# Patient Record
Sex: FEMALE | URBAN - METROPOLITAN AREA
[De-identification: names, ages, dates, MRNs, and addresses within clinical notes are randomized per-mention and may not be internally consistent; named-entity substitution may affect disease eponyms.]

---

## 2017-01-23 ENCOUNTER — OFFICE VISIT (OUTPATIENT)
Dept: FAMILY MEDICINE | Facility: CLINIC | Age: 22
End: 2017-01-23
Payer: COMMERCIAL

## 2017-01-23 VITALS
WEIGHT: 135.25 LBS | SYSTOLIC BLOOD PRESSURE: 102 MMHG | TEMPERATURE: 100 F | HEIGHT: 60 IN | DIASTOLIC BLOOD PRESSURE: 50 MMHG | BODY MASS INDEX: 26.55 KG/M2

## 2017-01-23 DIAGNOSIS — J10.1 INFLUENZA A: ICD-10-CM

## 2017-01-23 DIAGNOSIS — R07.0 THROAT PAIN: ICD-10-CM

## 2017-01-23 LAB
DEPRECATED S PYO AG THROAT QL EIA: NORMAL
FLUAV+FLUBV AG SPEC QL: ABNORMAL
FLUAV+FLUBV AG SPEC QL: ABNORMAL
MICRO REPORT STATUS: NORMAL
SPECIMEN SOURCE: ABNORMAL
SPECIMEN SOURCE: NORMAL

## 2017-01-23 PROCEDURE — 99213 OFFICE O/P EST LOW 20 MIN: CPT | Performed by: PHYSICIAN ASSISTANT

## 2017-01-23 PROCEDURE — 87081 CULTURE SCREEN ONLY: CPT | Performed by: PHYSICIAN ASSISTANT

## 2017-01-23 PROCEDURE — 87804 INFLUENZA ASSAY W/OPTIC: CPT | Performed by: PHYSICIAN ASSISTANT

## 2017-01-23 PROCEDURE — 87880 STREP A ASSAY W/OPTIC: CPT | Performed by: PHYSICIAN ASSISTANT

## 2017-01-23 RX ORDER — OSELTAMIVIR PHOSPHATE 75 MG/1
75 CAPSULE ORAL 2 TIMES DAILY
Qty: 10 CAPSULE | Refills: 0 | Status: SHIPPED | OUTPATIENT
Start: 2017-01-23 | End: 2017-07-11

## 2017-01-23 RX ORDER — BENZONATATE 100 MG/1
100 CAPSULE ORAL 3 TIMES DAILY PRN
Qty: 30 CAPSULE | Refills: 0 | Status: SHIPPED | OUTPATIENT
Start: 2017-01-23 | End: 2017-07-11

## 2017-01-23 NOTE — PROGRESS NOTES
SUBJECTIVE:                                                    Michelle Ospina is a 21 year old female who presents to clinic today for the following health issues:      Acute Illness   Acute illness concerns: URI  Onset: 3 days    Fever: YES, 102 highest    Chills/Sweats: YES    Headache (location?): no - had one last night    Sinus Pressure:no    Conjunctivitis:  no    Ear Pain: no    Rhinorrhea: YES    Congestion: no     Sore Throat: YES     Cough: YES, productive    Wheeze: YES    Decreased Appetite: YES    Nausea: no    Vomiting: no    Diarrhea:  no    Dysuria/Freq.: no    Fatigue/Achiness: YES    Sick/Strep Exposure: YES- sister had bronchitis     Therapies Tried and outcome: ibuprofen, tylenol, robitussen, dayquil, nyquil.          2 day hx of sudden onset cough, fever chills and body aches, no flu shot this year.  No n/v/d        Problem list and histories reviewed & adjusted, as indicated.  Additional history: as documented    Patient Active Problem List   Diagnosis     Migraine with aura and without status migrainosus, not intractable     Past Surgical History   Procedure Laterality Date     As repair of nasal septum         Social History   Substance Use Topics     Smoking status: Never Smoker      Smokeless tobacco: Not on file     Alcohol Use: Yes     Family History   Problem Relation Age of Onset     DIABETES No family hx of      Breast Cancer No family hx of      Colon Cancer No family hx of      Migraines Mother      Parkinsonism Maternal Grandmother          Current Outpatient Prescriptions   Medication Sig Dispense Refill     oseltamivir (TAMIFLU) 75 MG capsule Take 1 capsule (75 mg) by mouth 2 times daily 10 capsule 0     benzonatate (TESSALON) 100 MG capsule Take 1 capsule (100 mg) by mouth 3 times daily as needed 30 capsule 0     HAYDEN-BE 0.35 MG per tablet   3     SUMAtriptan (IMITREX) 100 MG tablet   5     No Known Allergies    ROS:  Constitutional, HEENT, cardiovascular, pulmonary, gi  "and gu systems are negative, except as otherwise noted.    OBJECTIVE:                                                    /50 mmHg  Temp(Src) 100  F (37.8  C) (Tympanic)  Ht 5' 0.24\" (1.53 m)  Wt 135 lb 4 oz (61.349 kg)  BMI 26.21 kg/m2  Breastfeeding? No  Body mass index is 26.21 kg/(m^2).  GENERAL: healthy, alert and no distress  EYES: Eyes grossly normal to inspection  HENT: ear canals and TM's normal, nose and mouth without ulcers or lesions  NECK: no adenopathy  RESP: lungs clear to auscultation - no rales, rhonchi or wheezes  CV: regular rate and rhythm, normal S1 S2, no S3 or S4, no murmur  Diagnostic Test Results:  Results for orders placed or performed in visit on 01/23/17 (from the past 24 hour(s))   Strep, Rapid Screen   Result Value Ref Range    Specimen Description Throat     Rapid Strep A Screen       NEGATIVE: No Group A streptococcal antigen detected by immunoassay, await   culture report.      Micro Report Status FINAL 01/23/2017    Influenza A/B antigen   Result Value Ref Range    Influenza A/B Agn Specimen Nasopharyngeal     Influenza A (A) NEG     Positive   Test results must be correlated with clinical data. If necessary, results   should be confirmed by a molecular assay or viral culture.      Influenza B  NEG     Negative   Test results must be correlated with clinical data. If necessary, results   should be confirmed by a molecular assay or viral culture.          ASSESSMENT/PLAN:                                                      1. Influenza A  Advise fluids rest, tamiflu given, note for work given as well. Advise she stay home until she is fever free x 24 hours  - oseltamivir (TAMIFLU) 75 MG capsule; Take 1 capsule (75 mg) by mouth 2 times daily  Dispense: 10 capsule; Refill: 0  - benzonatate (TESSALON) 100 MG capsule; Take 1 capsule (100 mg) by mouth 3 times daily as needed  Dispense: 30 capsule; Refill: 0    2. Throat pain  - Strep, Rapid Screen  - Beta strep group A " culture    3. Fever  - Influenza A/B antigen    See Patient Instructions    Kem Antonio PA-C  [facility]

## 2017-01-23 NOTE — Clinical Note
[facility]          [address] [place] [address]                            [phone]  Fax: [phone]    Michelle Ospina  [address]  [address]  [place] [address]    [id number]    January 23, 2017      To whom it may concern    Please excuse Michelle S Anai from work on 01/23/2017 through 01/24/2017 due to illness.  If you have any other questions or concerns please feel free to contact me at anytime.        Sincerely,      Kem Antonio PA-C

## 2017-01-23 NOTE — NURSING NOTE
"Chief Complaint   Patient presents with     URI       Initial /50 mmHg  Temp(Src) 100  F (37.8  C) (Tympanic)  Ht 5' 0.24\" (1.53 m)  Wt 135 lb 4 oz (61.349 kg)  BMI 26.21 kg/m2  Breastfeeding? No Estimated body mass index is 26.21 kg/(m^2) as calculated from the following:    Height as of this encounter: 5' 0.24\" (1.53 m).    Weight as of this encounter: 135 lb 4 oz (61.349 kg).  BP completed using cuff size: jose rafael Dela Cruz MA      "

## 2017-01-25 LAB
BACTERIA SPEC CULT: NORMAL
MICRO REPORT STATUS: NORMAL
SPECIMEN SOURCE: NORMAL

## 2017-01-25 NOTE — PROGRESS NOTES
Quick Note:    Michelle-  Here are your recent results. They are normal. If you have any questions please do not hesitate to contact our office via phone ([phone]) or through [employer].  ______

## 2017-07-11 ENCOUNTER — OFFICE VISIT (OUTPATIENT)
Dept: FAMILY MEDICINE | Facility: CLINIC | Age: 22
End: 2017-07-11
Payer: COMMERCIAL

## 2017-07-11 VITALS
OXYGEN SATURATION: 98 % | SYSTOLIC BLOOD PRESSURE: 114 MMHG | TEMPERATURE: 99.2 F | RESPIRATION RATE: 14 BRPM | DIASTOLIC BLOOD PRESSURE: 66 MMHG | HEIGHT: 60 IN | HEART RATE: 88 BPM | BODY MASS INDEX: 27.88 KG/M2 | WEIGHT: 142 LBS

## 2017-07-11 DIAGNOSIS — J35.8 TONSILLAR DEBRIS: ICD-10-CM

## 2017-07-11 DIAGNOSIS — R07.0 THROAT PAIN: Primary | ICD-10-CM

## 2017-07-11 LAB
DEPRECATED S PYO AG THROAT QL EIA: NORMAL
MICRO REPORT STATUS: NORMAL
SPECIMEN SOURCE: NORMAL

## 2017-07-11 PROCEDURE — 87880 STREP A ASSAY W/OPTIC: CPT | Performed by: PHYSICIAN ASSISTANT

## 2017-07-11 PROCEDURE — 87081 CULTURE SCREEN ONLY: CPT | Performed by: PHYSICIAN ASSISTANT

## 2017-07-11 PROCEDURE — 99213 OFFICE O/P EST LOW 20 MIN: CPT | Performed by: PHYSICIAN ASSISTANT

## 2017-07-11 NOTE — PROGRESS NOTES
"  SUBJECTIVE:                                                    Michelle Ospina is a 22 year old female who presents to clinic today for the following health issues:      Chief Complaint   Patient presents with     Throat Problem     white/ yellowish in throat  on either side- has tonsil stones, noticed 5 days ago, sister has them also-painful throat x2 days--positive exposure to strep-tried salt water gargle, teas, Ibuprofen, no help             Problem list and histories reviewed & adjusted, as indicated.  Additional history: 1 day of ST with some tonsil stones.  Her sister has been working with ENT for chronic tonsils stones in the past.  She did have some exposure to strep.  Denies any fevers or chills.      BP Readings from Last 3 Encounters:   07/11/17 114/66   01/23/17 102/50   07/01/16 117/70    Wt Readings from Last 3 Encounters:   07/11/17 142 lb (64.4 kg)   01/23/17 135 lb 4 oz (61.3 kg)   07/01/16 134 lb 9.6 oz (61.1 kg)                    Reviewed and updated as needed this visit by clinical staff  Tobacco  Allergies  Meds  Med Hx  Surg Hx  Fam Hx  Soc Hx      Reviewed and updated as needed this visit by Provider         ROS:  Constitutional, HEENT, cardiovascular, pulmonary, gi and gu systems are negative, except as otherwise noted.    OBJECTIVE:     /66  Pulse 88  Temp 99.2  F (37.3  C) (Oral)  Resp 14  Ht 5' 0.25\" (1.53 m)  Wt 142 lb (64.4 kg)  SpO2 98%  Breastfeeding? No  BMI 27.5 kg/m2  Body mass index is 27.5 kg/(m^2).  GENERAL: alert and no distress  EYES: Eyes grossly normal to inspection  HENT: normal cephalic/atraumatic, ear canals and TM's normal, nose and mouth without ulcers or lesions, tonsillar hypertrophy and tonsillar exudate  RESP: lungs clear to auscultation - no rales, rhonchi or wheezes  CV: regular rate and rhythm, normal S1 S2, no S3 or S4, no murmur, click or rub, no peripheral edema and peripheral pulses strong  PSYCH: mentation appears normal, affect " normal/bright    Diagnostic Test Results:  Results for orders placed or performed in visit on 07/11/17   Rapid strep screen   Result Value Ref Range    Specimen Description Throat     Rapid Strep A Screen       NEGATIVE: No Group A streptococcal antigen detected by immunoassay, await   culture report.      Micro Report Status FINAL 07/11/2017    Beta strep group A culture   Result Value Ref Range    Specimen Description Throat     Culture Micro No beta hemolytic Streptococcus Group A isolated     Micro Report Status FINAL 07/12/2017        ASSESSMENT/PLAN:             1. Throat pain  Negative strep.  - Rapid strep screen  - Beta strep group A culture    2. Tonsillar debris  Discussed supportive treatment.  Offered referral to ENT, she will hold off for now.          Mckay Holguin PA-C  [facility]

## 2017-07-11 NOTE — MR AVS SNAPSHOT
"              After Visit Summary   7/11/2017    Michelle Ospina    MRN: [id number]           Patient Information     Date Of Birth          1995        Visit Information        Provider Department      7/11/2017 5:40 PM Mckay Holguin PA-C [facility]        Today's Diagnoses     Throat pain    -  1    Tonsillar debris           Follow-ups after your visit        Who to contact     If you have questions or need follow up information about today's clinic visit or your schedule please contact [facility] directly at [phone].  Normal or non-critical lab and imaging results will be communicated to you by [employer], letter or phone within 4 business days after the clinic has received the results. If you do not hear from us within 7 days, please contact the clinic through [employer] or phone. If you have a critical or abnormal lab result, we will notify you by phone as soon as possible.  Submit refill requests through [employer] or call your pharmacy and they will forward the refill request to us. Please allow 3 business days for your refill to be completed.          Additional Information About Your Visit        MyChart Information     [employer] gives you secure access to your electronic health record. If you see a primary care provider, you can also send messages to your care team and make appointments. If you have questions, please call your primary care clinic.  If you do not have a primary care provider, please call [phone] and they will assist you.        Care EveryWhere ID     This is your Care EveryWhere ID. This could be used by other organizations to access your [facility] medical records  [id number]        Your Vitals Were     Pulse Temperature Respirations Height Pulse Oximetry Breastfeeding?    88 99.2  F (37.3  C) (Oral) 14 5' 0.25\" (1.53 m) 98% No    BMI (Body Mass Index)                   27.5 kg/m2            Blood Pressure from Last 3 Encounters:   07/11/17 " 114/66   01/23/17 102/50   07/01/16 117/70    Weight from Last 3 Encounters:   07/11/17 142 lb (64.4 kg)   01/23/17 135 lb 4 oz (61.3 kg)   07/01/16 134 lb 9.6 oz (61.1 kg)              We Performed the Following     MIGRAINE ACTION PLAN     Rapid strep screen        Primary Care Provider Office Phone #    [facility] [phone]       No address on file        Equal Access to Services     MARY BISWAS : Hadii aad ku hadasho Soomaali, waaxda luqadaha, qaybta kaalmada adeegyada, waxay idiin hayaan adeeg emily zamora . So [facility] [phone].    ATENCIÓN: Si habla español, tiene a gonsalez disposición servicios gratuitos de asistencia lingüística. Llame al [phone].    We comply with applicable federal civil rights laws and [place] laws. We do not discriminate on the basis of race, color, national origin, age, disability sex, sexual orientation or gender identity.            Thank you!     Thank you for choosing [facility]  for your care. Our goal is always to provide you with excellent care. Hearing back from our patients is one way we can continue to improve our services. Please take a few minutes to complete the written survey that you may receive in the mail after your visit with us. Thank you!             Your Updated Medication List - Protect others around you: Learn how to safely use, store and throw away your medicines at www.disposemymeds.org.          This list is accurate as of: 7/11/17  6:34 PM.  Always use your most recent med list.                   Brand Name Dispense Instructions for use Diagnosis    SUMAtriptan 100 MG tablet    IMITREX

## 2017-07-12 LAB
BACTERIA SPEC CULT: NORMAL
MICRO REPORT STATUS: NORMAL
SPECIMEN SOURCE: NORMAL

## 2020-03-10 ENCOUNTER — HEALTH MAINTENANCE LETTER (OUTPATIENT)
Age: 25
End: 2020-03-10

## 2020-12-27 ENCOUNTER — HEALTH MAINTENANCE LETTER (OUTPATIENT)
Age: 25
End: 2020-12-27

## 2021-04-24 ENCOUNTER — HEALTH MAINTENANCE LETTER (OUTPATIENT)
Age: 26
End: 2021-04-24

## 2021-10-09 ENCOUNTER — HEALTH MAINTENANCE LETTER (OUTPATIENT)
Age: 26
End: 2021-10-09

## 2022-05-16 ENCOUNTER — HEALTH MAINTENANCE LETTER (OUTPATIENT)
Age: 27
End: 2022-05-16

## 2022-09-11 ENCOUNTER — HEALTH MAINTENANCE LETTER (OUTPATIENT)
Age: 27
End: 2022-09-11

## 2023-06-03 ENCOUNTER — HEALTH MAINTENANCE LETTER (OUTPATIENT)
Age: 28
End: 2023-06-03